# Patient Record
Sex: FEMALE | Race: BLACK OR AFRICAN AMERICAN | Employment: OTHER | URBAN - METROPOLITAN AREA
[De-identification: names, ages, dates, MRNs, and addresses within clinical notes are randomized per-mention and may not be internally consistent; named-entity substitution may affect disease eponyms.]

---

## 2017-12-18 ENCOUNTER — HOSPITAL ENCOUNTER (OUTPATIENT)
Dept: MAMMOGRAPHY | Age: 57
Discharge: HOME | End: 2017-12-18
Attending: OBSTETRICS & GYNECOLOGY
Payer: COMMERCIAL

## 2017-12-18 DIAGNOSIS — Z12.39 SCREENING BREAST EXAMINATION: ICD-10-CM

## 2017-12-18 PROCEDURE — 77067 SCR MAMMO BI INCL CAD: CPT

## 2017-12-26 ENCOUNTER — TELEPHONE (OUTPATIENT)
Dept: OBGYN CLINIC | Age: 57
End: 2017-12-26

## 2017-12-26 NOTE — TELEPHONE ENCOUNTER
I called central scheduling to schedule, but she already has an appointment for the L breast additional views and possible ultrasound on 12/29/17 at 9:15am.

## 2017-12-26 NOTE — TELEPHONE ENCOUNTER
61year old patient called back to office for her mammogram results. Advised patient of the need for additional views and she verbalized understanding. She would like for April to schedule her appointment with the [facility]. She is available all this week if possible.

## 2017-12-29 ENCOUNTER — HOSPITAL ENCOUNTER (OUTPATIENT)
Dept: MAMMOGRAPHY | Age: 57
Discharge: HOME | End: 2017-12-29
Attending: OBSTETRICS & GYNECOLOGY
Payer: COMMERCIAL

## 2017-12-29 DIAGNOSIS — R92.8 ABNORMAL MAMMOGRAM: ICD-10-CM

## 2017-12-29 PROCEDURE — 77065 DX MAMMO INCL CAD UNI: CPT

## 2017-12-29 PROCEDURE — 76642 ULTRASOUND BREAST LIMITED: CPT

## 2018-03-16 ENCOUNTER — OFFICE VISIT (OUTPATIENT)
Dept: OBGYN CLINIC | Age: 58
End: 2018-03-16

## 2018-03-16 VITALS
HEIGHT: 67 IN | SYSTOLIC BLOOD PRESSURE: 118 MMHG | DIASTOLIC BLOOD PRESSURE: 74 MMHG | WEIGHT: 224 LBS | BODY MASS INDEX: 35.16 KG/M2

## 2018-03-16 DIAGNOSIS — Z79.890 NEED FOR PROPHYLACTIC HORMONE REPLACEMENT THERAPY (POSTMENOPAUSAL): ICD-10-CM

## 2018-03-16 DIAGNOSIS — Z01.419 ENCOUNTER FOR GYNECOLOGICAL EXAMINATION WITHOUT ABNORMAL FINDING: ICD-10-CM

## 2018-03-16 RX ORDER — ESTRADIOL 1 MG/1
1 TABLET ORAL DAILY
Qty: 90 TAB | Refills: 4 | Status: SHIPPED | OUTPATIENT
Start: 2018-03-16 | End: 2019-07-11 | Stop reason: SDUPTHER

## 2018-03-16 NOTE — PROGRESS NOTES
Alli Fierro is a ,  60 y.o. female [address] whose No LMP recorded. Patient has had a hysterectomy. who presents for her annual checkup. She is having no significant problems. Hormone Status:    She is not having vasomotor symptoms. The patient is using HRT: Estradiol    Sexual history:    She  reports that she currently engages in sexual activity and has had male partners. She reports using the following method of birth control/protection: Surgical.    Medical conditions:    Since her last annual GYN exam about 2016 ago, she has had the following changes in her health history: none. Pap and Mammogram History:    Her most recent Pap smear was normal obtained 2016     The patient had arecent mammogram 2017, abnormal. Additional views wnl. .    Breast Cancer History/Substance Abuse:    She has no family history of breast cancer. Osteoporosis History:    Family history does not include a first or second degree relative with osteopenia or osteoporosis. A bone density scan was not obtained. She is currently taking calcium and vit D. Past Medical History:   Diagnosis Date    H/O mammogram 12    Negative-in our office    Hx of ectopic pregnancy      Past Surgical History:   Procedure Laterality Date    HX LEFT SALPINGECTOMY      ectopic    HX MELLO AND BSO  10/01    HX TUBAL LIGATION       Tobacco History:  reports that she has never smoked. She does not have any smokeless tobacco history on file. Alcohol Abuse:  reports that she does not drink alcohol. Drug Abuse:  reports that she does not use illicit drugs. Current Outpatient Prescriptions   Medication Sig Dispense Refill    estradiol (ESTRACE) 1 mg tablet Take 1 Tab by mouth daily. 90 Tab 4    vitamin e (E GEMS) 1,000 unit capsule Take 1,000 Units by mouth daily.  loratadine (CLARITIN) 10 mg tablet Take 10 mg by mouth daily.       multivitamin (ONE A DAY) tablet Take 1 tablet by mouth daily. Allergies: Penicillins   Social History     Social History    Marital status:      Spouse name: N/A    Number of children: N/A    Years of education: N/A     Occupational History    Not on file. Social History Main Topics    Smoking status: Never Smoker    Smokeless tobacco: Not on file    Alcohol use No    Drug use: No    Sexual activity: Yes     Partners: Male     Birth control/ protection: Surgical     Other Topics Concern    Not on file     Social History Narrative     There is no problem list on file for this patient.       Review of Systems - History obtained from the patient  Constitutional: negative for weight loss, fever, night sweats  HEENT: negative for hearing loss, earache, congestion, snoring, sorethroat  CV: negative for chest pain, palpitations, edema  Resp: negative for cough, shortness of breath, wheezing  GI: negative for change in bowel habits, abdominal pain, black or bloody stools  : negative for frequency, dysuria, hematuria, vaginal discharge  MSK: negative for back pain, joint pain, muscle pain  Breast: negative for breast lumps, nipple discharge, galactorrhea  Skin :negative for itching, rash, hives  Neuro: negative for dizziness, headache, confusion, weakness  Psych: negative for anxiety, depression, change in mood  Heme/lymph: negative for bleeding, bruising, pallor    Physical Exam    Visit Vitals    /74    Ht 5' 7\" (1.702 m)    Wt 224 lb (101.6 kg)    BMI 35.08 kg/m2     Constitutional  · Appearance: well-nourished, well developed, alert, in no acute distress    HENT  · Head and Face: appears normal    Neck  · Inspection/Palpation: normal appearance, no masses or tenderness  · Lymph Nodes: no lymphadenopathy present  · Thyroid: gland size normal, nontender, no nodules or masses present on palpation    Chest  · Respiratory Effort: breathing normal        Auscultation: normal breath sounds    Cardiovascular  · Heart:  · Auscultation: regular rate and rhythm without murmur    Breasts  · Inspection of Breasts: breasts symmetrical, no skin changes, no discharge present, nipple appearance normal, no skin retraction present  · Palpation of Breasts and Axillae: no masses present on palpation, no breast tenderness  · Axillary Lymph Nodes: no lymphadenopathy present    Gastrointestinal  · Abdominal Examination: abdomen non-tender to palpation, normal bowel sounds, no masses present  · Liver and spleen: no hepatomegaly present, spleen not palpable  · Hernias: no hernias identified    Genitourinary  · External Genitalia: normal appearance for age, no discharge present, no tenderness present, no inflammatory lesions present, no masses present, no atrophy present  · Vagina: normal vaginal vault without central or paravaginal defects, no discharge present, no inflammatory lesions present, no masses present  · Bladder: non-tender to palpation  · Urethra: appears normal  · Cervix: absent  · Uterus: absent  · Adnexa: no adnexal tenderness present, no adnexal masses present  · Perineum: perineum within normal limits, no evidence of trauma, no rashes or skin lesions present  · Anus: anus within normal limits, no hemorrhoids present  · Inguinal Lymph Nodes: no lymphadenopathy present      Skin  · General Inspection: no rash, no lesions identified    Neurologic/Psychiatric  · Mental Status:  · Orientation: grossly oriented to person, place and time  · Mood and Affect: mood normal, affect appropriate    . Assessment:  Routine gynecologic examination  Her current medical status is satisfactory with no evidence of significant gynecologic issues.   HRT  Plan:  Counseled re: diet, exercise, healthy lifestyle  Return for yearly wellness visits  Rec annual mammogram  Will do 3D mammo here  Cont estradiol

## 2018-03-16 NOTE — PATIENT INSTRUCTIONS
Well Visit, Women 48 to 72: Care Instructions  Your Care Instructions    Physical exams can help you stay healthy. Your doctor has checked your overall health and may have suggested ways to take good care of yourself. He or she also may have recommended tests. At home, you can help prevent illness with healthy eating, regular exercise, and other steps. Follow-up care is a key part of your treatment and safety. Be sure to make and go to all appointments, and call your doctor if you are having problems. It's also a good idea to know your test results and keep a list of the medicines you take. How can you care for yourself at home? · Reach and stay at a healthy weight. This will lower your risk for many problems, such as obesity, diabetes, heart disease, and high blood pressure. · Get at least 30 minutes of exercise on most days of the week. Walking is a good choice. You also may want to do other activities, such as running, swimming, cycling, or playing tennis or team sports. · Do not smoke. Smoking can make health problems worse. If you need help quitting, talk to your doctor about stop-smoking programs and medicines. These can increase your chances of quitting for good. · Protect your skin from too much sun. When you're outdoors from 10 a.m. to 4 p.m., stay in the shade or cover up with clothing and a hat with a wide brim. Wear sunglasses that block UV rays. Even when it's cloudy, put broad-spectrum sunscreen (SPF 30 or higher) on any exposed skin. · See a dentist one or two times a year for checkups and to have your teeth cleaned. · Wear a seat belt in the car. · Limit alcohol to 1 drink a day. Too much alcohol can cause health problems. Follow your doctor's advice about when to have certain tests. These tests can spot problems early. · Cholesterol.  Your doctor will tell you how often to have this done based on your age, family history, or other things that can increase your risk for heart attack and stroke. · Blood pressure. Have your blood pressure checked during a routine doctor visit. Your doctor will tell you how often to check your blood pressure based on your age, your blood pressure results, and other factors. · Mammogram. Ask your doctor how often you should have a mammogram, which is an X-ray of your breasts. A mammogram can spot breast cancer before it can be felt and when it is easiest to treat. · Pap test and pelvic exam. Ask your doctor how often you should have a Pap test. You may not need to have a Pap test as often as you used to. · Vision. Have your eyes checked every year or two or as often as your doctor suggests. Some experts recommend that you have yearly exams for glaucoma and other age-related eye problems starting at age 48. · Hearing. Tell your doctor if you notice any change in your hearing. You can have tests to find out how well you hear. · Diabetes. Ask your doctor whether you should have tests for diabetes. · Colon cancer. You should begin tests for colon cancer at age 48. You may have one of several tests. Your doctor will tell you how often to have tests based on your age and risk. Risks include whether you already had a precancerous polyp removed from your colon or whether your parents, sisters and brothers, or children have had colon cancer. · Thyroid disease. Talk to your doctor about whether to have your thyroid checked as part of a regular physical exam. Women have an increased chance of a thyroid problem. · Osteoporosis. You should begin tests for bone density at age 72. If you are younger than 72, ask your doctor whether you have factors that may increase your risk for this disease. You may want to have this test before age 72. · Heart attack and stroke risk. At least every 4 to 6 years, you should have your risk for heart attack and stroke assessed.  Your doctor uses factors such as your age, blood pressure, cholesterol, and whether you smoke or have diabetes to show what your risk for a heart attack or stroke is over the next 10 years. When should you call for help? Watch closely for changes in your health, and be sure to contact your doctor if you have any problems or symptoms that concern you. Where can you learn more? Go to http://marcy-rosanne.info/. Enter E061 in the search box to learn more about \"Well Visit, Women 50 to 72: Care Instructions. \"  Current as of: May 12, 2017  Content Version: 11.4  © 1647-3145 [employer]. Care instructions adapted under license by [employer] (which disclaims liability or warranty for this information). If you have questions about a medical condition or this instruction, always ask your healthcare professional. [address] any warranty or liability for your use of this information.

## 2018-12-19 ENCOUNTER — TELEPHONE (OUTPATIENT)
Dept: OBGYN CLINIC | Age: 58
End: 2018-12-19

## 2018-12-19 NOTE — TELEPHONE ENCOUNTER
Call received at 10:39am      60year old patient last seen in the office on 3/16/18. Patient calling to say that she got a reminder regarding an appointment tomorrow. This nurse advised that it is for a mammogram.  Patient states she usually goes to the imaging center and already has an appointment. Patient asked for the appointment to be cancelled. Patient verbalized understanding.

## 2019-01-24 ENCOUNTER — HOSPITAL ENCOUNTER (OUTPATIENT)
Dept: MAMMOGRAPHY | Age: 59
Discharge: HOME | End: 2019-01-24
Attending: OBSTETRICS & GYNECOLOGY
Payer: COMMERCIAL

## 2019-01-24 DIAGNOSIS — Z12.39 SCREENING BREAST EXAMINATION: ICD-10-CM

## 2019-01-24 DIAGNOSIS — Z12.31 VISIT FOR SCREENING MAMMOGRAM: ICD-10-CM

## 2019-01-24 PROCEDURE — 77067 SCR MAMMO BI INCL CAD: CPT

## 2019-01-24 PROCEDURE — 77063 BREAST TOMOSYNTHESIS BI: CPT

## 2019-07-11 DIAGNOSIS — Z01.419 ENCOUNTER FOR GYNECOLOGICAL EXAMINATION WITHOUT ABNORMAL FINDING: ICD-10-CM

## 2019-07-11 DIAGNOSIS — Z79.890 NEED FOR PROPHYLACTIC HORMONE REPLACEMENT THERAPY (POSTMENOPAUSAL): ICD-10-CM

## 2019-07-11 RX ORDER — ESTRADIOL 1 MG/1
1 TABLET ORAL DAILY
Qty: 30 TAB | Refills: 0 | Status: SHIPPED | OUTPATIENT
Start: 2019-07-11 | End: 2019-07-30 | Stop reason: SDUPTHER

## 2019-07-11 NOTE — PROGRESS NOTES
Pt called requesting a RF on her estradiol. I informed her that she needs to make an appt for an AE. Appt scheduled for 7/30/19 at 10:40 w/ Mayte Diane. 30 script sent in to local pharmacy.

## 2019-07-30 ENCOUNTER — OFFICE VISIT (OUTPATIENT)
Dept: OBGYN CLINIC | Age: 59
End: 2019-07-30

## 2019-07-30 VITALS
WEIGHT: 228 LBS | HEIGHT: 67 IN | BODY MASS INDEX: 35.79 KG/M2 | SYSTOLIC BLOOD PRESSURE: 137 MMHG | DIASTOLIC BLOOD PRESSURE: 91 MMHG

## 2019-07-30 DIAGNOSIS — E66.01 SEVERE OBESITY (HCC): ICD-10-CM

## 2019-07-30 DIAGNOSIS — E28.39 ESTROGEN DEFICIENCY: ICD-10-CM

## 2019-07-30 DIAGNOSIS — Z79.890 NEED FOR PROPHYLACTIC HORMONE REPLACEMENT THERAPY (POSTMENOPAUSAL): ICD-10-CM

## 2019-07-30 DIAGNOSIS — Z01.419 ENCOUNTER FOR GYNECOLOGICAL EXAMINATION WITHOUT ABNORMAL FINDING: Primary | ICD-10-CM

## 2019-07-30 RX ORDER — ESTRADIOL 1 MG/1
1 TABLET ORAL DAILY
Qty: 90 TAB | Refills: 4 | Status: SHIPPED | OUTPATIENT
Start: 2019-07-30 | End: 2020-09-11 | Stop reason: SDUPTHER

## 2019-07-30 NOTE — PROGRESS NOTES
Kishor Fish is a ,  60 y.o. female [address] whose No LMP recorded. Patient has had a hysterectomy. who presents for her annual checkup. She is having no significant problems. Hormone Status:    She is not having vasomotor symptoms. The patient is using HRT: Estrace  Sexual history:    She  reports that she currently engages in sexual activity and has had partner(s) who are Male. She reports using the following method of birth control/protection: Surgical.    Medical conditions:    Since her last annual GYN exam about 3/16/2018 ago, she has had the following changes in her health history: none. Pap and Mammogram History:    Her most recent Pap smear wasnormal obtained 2016 year(s) ago. The patient had a recent mammogram 2019 which was negative for malignancy. Breast Cancer History/Substance Abuse:    She has no family history of breast cancer. Osteoporosis History:    Family history does not include a first or second degree relative with osteopenia or osteoporosis. A bone density scan was not obtained. Order placed today. She is currently taking calcium and vit D. Past Medical History:   Diagnosis Date    H/O mammogram 12    Negative-in our office    Hx of ectopic pregnancy      Past Surgical History:   Procedure Laterality Date    HX LEFT SALPINGECTOMY      ectopic    HX MELLO AND BSO  10/01    HX TUBAL LIGATION       Tobacco History:  reports that she has never smoked. She does not have any smokeless tobacco history on file. Alcohol Abuse:  reports that she does not drink alcohol. Drug Abuse:  reports that she does not use drugs. Current Outpatient Medications   Medication Sig Dispense Refill    estradiol (ESTRACE) 1 mg tablet Take 1 Tab by mouth daily. 30 Tab 0    vitamin e (E GEMS) 1,000 unit capsule Take 1,000 Units by mouth daily.  loratadine (CLARITIN) 10 mg tablet Take 10 mg by mouth daily.       multivitamin (ONE A DAY) tablet Take 1 tablet by mouth daily. Allergies: Penicillins   Social History     Socioeconomic History    Marital status:      Spouse name: Not on file    Number of children: Not on file    Years of education: Not on file    Highest education level: Not on file   Occupational History    Not on file   Social Needs    Financial resource strain: Not on file    Food insecurity:     Worry: Not on file     Inability: Not on file    Transportation needs:     Medical: Not on file     Non-medical: Not on file   Tobacco Use    Smoking status: Never Smoker   Substance and Sexual Activity    Alcohol use: No    Drug use: No    Sexual activity: Yes     Partners: Male     Birth control/protection: Surgical   Lifestyle    Physical activity:     Days per week: Not on file     Minutes per session: Not on file    Stress: Not on file   Relationships    Social connections:     Talks on phone: Not on file     Gets together: Not on file     Attends Holiness service: Not on file     Active member of club or organization: Not on file     Attends meetings of clubs or organizations: Not on file     Relationship status: Not on file    Intimate partner violence:     Fear of current or ex partner: Not on file     Emotionally abused: Not on file     Physically abused: Not on file     Forced sexual activity: Not on file   Other Topics Concern    Not on file   Social History Narrative    Not on file     There is no problem list on file for this patient.       Review of Systems - History obtained from the patient  Constitutional: negative for weight loss, fever, night sweats  HEENT: negative for hearing loss, earache, congestion, snoring, sorethroat  CV: negative for chest pain, palpitations, edema  Resp: negative for cough, shortness of breath, wheezing  GI: negative for change in bowel habits, abdominal pain, black or bloody stools  : negative for frequency, dysuria, hematuria, vaginal discharge  MSK: negative for back pain, joint pain, muscle pain  Breast: negative for breast lumps, nipple discharge, galactorrhea  Skin :negative for itching, rash, hives  Neuro: negative for dizziness, headache, confusion, weakness  Psych: negative for anxiety, depression, change in mood  Heme/lymph: negative for bleeding, bruising, pallor    Physical Exam    Visit Vitals  BP (!) 137/91   Ht 5' 7\" (1.702 m)   Wt 228 lb (103.4 kg)   BMI 35.71 kg/m²     Constitutional  · Appearance: well-nourished, well developed, alert, in no acute distress    HENT  · Head and Face: appears normal    Neck  · Inspection/Palpation: normal appearance, no masses or tenderness  · Lymph Nodes: no lymphadenopathy present  · Thyroid: gland size normal, nontender, no nodules or masses present on palpation    Chest  · Respiratory Effort: breathing normal        Auscultation: normal breath sounds    Cardiovascular  · Heart:  · Auscultation: regular rate and rhythm without murmur    Breasts  · Inspection of Breasts: breasts symmetrical, no skin changes, no discharge present, nipple appearance normal, no skin retraction present  · Palpation of Breasts and Axillae: no masses present on palpation, no breast tenderness  · Axillary Lymph Nodes: no lymphadenopathy present    Gastrointestinal  · Abdominal Examination: abdomen non-tender to palpation, normal bowel sounds, no masses present  · Liver and spleen: no hepatomegaly present, spleen not palpable  · Hernias: no hernias identified    Genitourinary  · External Genitalia: normal appearance for age, no discharge present, no tenderness present, no inflammatory lesions present, no masses present, no atrophy present  · Vagina: normal vaginal vault without central or paravaginal defects, no discharge present, no inflammatory lesions present, no masses present  · Bladder: non-tender to palpation  · Urethra: appears normal  · Cervix: absent  · Uterus: absent  · Adnexa: no adnexal tenderness present, no adnexal masses present  · Perineum: perineum within normal limits, no evidence of trauma, no rashes or skin lesions present  · Anus: anus within normal limits, no hemorrhoids present  · Inguinal Lymph Nodes: no lymphadenopathy present      Skin  · General Inspection: no rash, no lesions identified    Neurologic/Psychiatric  · Mental Status:  · Orientation: grossly oriented to person, place and time  · Mood and Affect: mood normal, affect appropriate    . Assessment:  Routine gynecologic examination  Her current medical status is satisfactory with no evidence of significant gynecologic issues.     Plan:  Counseled re: diet, exercise, healthy lifestyle  Return for yearly wellness visits  Rec annual mammogram  Pap  Cont estradiol  DEXA

## 2019-08-02 LAB
CYTOLOGIST CVX/VAG CYTO: NORMAL
CYTOLOGY CVX/VAG DOC CYTO: NORMAL
CYTOLOGY CVX/VAG DOC THIN PREP: NORMAL
CYTOLOGY HISTORY:: NORMAL
DX ICD CODE: NORMAL
LABCORP, 190119: NORMAL
Lab: NORMAL
OTHER STN SPEC: NORMAL
STAT OF ADQ CVX/VAG CYTO-IMP: NORMAL

## 2019-09-04 ENCOUNTER — HOSPITAL ENCOUNTER (OUTPATIENT)
Dept: MAMMOGRAPHY | Age: 59
Discharge: HOME | End: 2019-09-04
Attending: OBSTETRICS & GYNECOLOGY
Payer: COMMERCIAL

## 2019-09-04 DIAGNOSIS — E28.39 ESTROGEN DEFICIENCY: ICD-10-CM

## 2019-09-04 PROCEDURE — 77080 DXA BONE DENSITY AXIAL: CPT

## 2020-06-08 ENCOUNTER — HOSPITAL ENCOUNTER (OUTPATIENT)
Dept: MAMMOGRAPHY | Age: 60
Discharge: HOME | End: 2020-06-08
Attending: OBSTETRICS & GYNECOLOGY
Payer: COMMERCIAL

## 2020-06-08 DIAGNOSIS — Z12.31 VISIT FOR SCREENING MAMMOGRAM: ICD-10-CM

## 2020-06-08 PROCEDURE — 77063 BREAST TOMOSYNTHESIS BI: CPT

## 2020-06-11 ENCOUNTER — HOSPITAL ENCOUNTER (OUTPATIENT)
Dept: MAMMOGRAPHY | Age: 60
Discharge: HOME | End: 2020-06-11
Attending: NURSE PRACTITIONER
Payer: COMMERCIAL

## 2020-06-11 DIAGNOSIS — R92.8 ABNORMALITY OF RIGHT BREAST ON SCREENING MAMMOGRAM: ICD-10-CM

## 2020-06-11 PROCEDURE — 76642 ULTRASOUND BREAST LIMITED: CPT

## 2020-09-11 ENCOUNTER — OFFICE VISIT (OUTPATIENT)
Dept: OBGYN CLINIC | Age: 60
End: 2020-09-11
Payer: COMMERCIAL

## 2020-09-11 VITALS — SYSTOLIC BLOOD PRESSURE: 128 MMHG | BODY MASS INDEX: 35.87 KG/M2 | WEIGHT: 229 LBS | DIASTOLIC BLOOD PRESSURE: 82 MMHG

## 2020-09-11 DIAGNOSIS — Z01.419 ENCOUNTER FOR GYNECOLOGICAL EXAMINATION WITHOUT ABNORMAL FINDING: Primary | ICD-10-CM

## 2020-09-11 DIAGNOSIS — Z79.890 NEED FOR PROPHYLACTIC HORMONE REPLACEMENT THERAPY (POSTMENOPAUSAL): ICD-10-CM

## 2020-09-11 PROCEDURE — 99396 PREV VISIT EST AGE 40-64: CPT | Performed by: OBSTETRICS & GYNECOLOGY

## 2020-09-11 RX ORDER — ESTRADIOL 1 MG/1
1 TABLET ORAL DAILY
Qty: 90 TAB | Refills: 3 | Status: SHIPPED | OUTPATIENT
Start: 2020-09-11 | End: 2020-12-01 | Stop reason: SDUPTHER

## 2020-09-11 RX ORDER — ESTRADIOL 1 MG/1
1 TABLET ORAL DAILY
Qty: 90 TAB | Refills: 0 | Status: SHIPPED | OUTPATIENT
Start: 2020-09-11 | End: 2020-12-14

## 2020-09-11 NOTE — PROGRESS NOTES
Ariana Andrade is a ,  63 y.o. female [address] Patient has had a hysterectomy. who presents for her annual checkup. She is having no significant problems. [place]  of [place]    Hormone Status:    She is not having vasomotor symptoms. The patient is using HRT: estrace    Sexual history:    She  reports being sexually active and has had partner(s) who are Male. She reports using the following method of birth control/protection: Surgical.    Medical conditions:    Since her last annual GYN exam was 19 ,she has had the following changes in her health history: none. Pap and Mammogram History:    Her most recent Pap smear was normal HPV NEG obtained 19. The patient had mammo on 2020 then adtl views on 2020 which were WNL. Breast Cancer History/Substance Abuse:    She has no and a family history of breast cancer. Osteoporosis History:    Family history does not include a first or second degree relative with osteopenia or osteoporosis. A bone density scan was obtained 19 and revealed a normal scan, osteopenia, osteoporosis. .   She is currently taking calcium and vit D. Past Medical History:   Diagnosis Date    H/O mammogram 13    Negative-in our office    Hx of ectopic pregnancy     Routine Papanicolaou smear 19 neg     Past Surgical History:   Procedure Laterality Date    HX LEFT SALPINGECTOMY      ectopic    HX MELLO AND BSO  10/01    HX TUBAL LIGATION       Tobacco History:  reports that she has never smoked. She has never used smokeless tobacco.  Alcohol Abuse:  reports no history of alcohol use. Drug Abuse:  reports no history of drug use. Current Outpatient Medications   Medication Sig Dispense Refill    estradiol (ESTRACE) 1 mg tablet Take 1 Tab by mouth daily. 90 Tab 4    vitamin e (E GEMS) 1,000 unit capsule Take 1,000 Units by mouth daily.  multivitamin (ONE A DAY) tablet Take 1 tablet by mouth daily.       loratadine (CLARITIN) 10 mg tablet Take 10 mg by mouth daily.        Allergies: Penicillins   Social History     Socioeconomic History    Marital status:      Spouse name: Not on file    Number of children: Not on file    Years of education: Not on file    Highest education level: Not on file   Occupational History    Not on file   Social Needs    Financial resource strain: Not on file    Food insecurity     Worry: Not on file     Inability: Not on file    Transportation needs     Medical: Not on file     Non-medical: Not on file   Tobacco Use    Smoking status: Never Smoker    Smokeless tobacco: Never Used   Substance and Sexual Activity    Alcohol use: No    Drug use: No    Sexual activity: Yes     Partners: Male     Birth control/protection: Surgical   Lifestyle    Physical activity     Days per week: Not on file     Minutes per session: Not on file    Stress: Not on file   Relationships    Social connections     Talks on phone: Not on file     Gets together: Not on file     Attends Methodist service: Not on file     Active member of club or organization: Not on file     Attends meetings of clubs or organizations: Not on file     Relationship status: Not on file    Intimate partner violence     Fear of current or ex partner: Not on file     Emotionally abused: Not on file     Physically abused: Not on file     Forced sexual activity: Not on file   Other Topics Concern    Not on file   Social History Narrative    Not on file     Patient Active Problem List   Diagnosis Code    Severe obesity ([facility] [address] [facility] E66.01       Review of Systems - History obtained from the patient  Constitutional: negative for weight loss, fever, night sweats  HEENT: negative for hearing loss, earache, congestion, snoring, sorethroat  CV: negative for chest pain, palpitations, edema  Resp: negative for cough, shortness of breath, wheezing  GI: negative for change in bowel habits, abdominal pain, black or bloody stools  : negative for frequency, dysuria, hematuria, vaginal discharge  MSK: negative for back pain, joint pain, muscle pain  Breast: negative for breast lumps, nipple discharge, galactorrhea  Skin :negative for itching, rash, hives  Neuro: negative for dizziness, headache, confusion, weakness  Psych: negative for anxiety, depression, change in mood  Heme/lymph: negative for bleeding, bruising, pallor    Physical Exam    Visit Vitals  /82   Wt 229 lb (103.9 kg)   BMI 35.87 kg/m²     Constitutional  · Appearance: well-nourished, well developed, alert, in no acute distress    HENT  · Head and Face: appears normal    Neck  · Inspection/Palpation: normal appearance, no masses or tenderness  · Lymph Nodes: no lymphadenopathy present  · Thyroid: gland size normal, nontender, no nodules or masses present on palpation    Chest  · Respiratory Effort: breathing normal        Auscultation: normal breath sounds    Cardiovascular  · Heart:  · Auscultation: regular rate and rhythm without murmur    Breasts  · Inspection of Breasts: breasts symmetrical, no skin changes, no discharge present, nipple appearance normal, no skin retraction present  · Palpation of Breasts and Axillae: no masses present on palpation, no breast tenderness  · Axillary Lymph Nodes: no lymphadenopathy present    Gastrointestinal  · Abdominal Examination: abdomen non-tender to palpation, normal bowel sounds, no masses present  · Liver and spleen: no hepatomegaly present, spleen not palpable  · Hernias: no hernias identified    Genitourinary  · External Genitalia: normal appearance for age, no discharge present, no tenderness present, no inflammatory lesions present, no masses present, no atrophy present  · Vagina: normal vaginal vault without central or paravaginal defects, no discharge present, no inflammatory lesions present, no masses present  · Bladder: non-tender to palpation  · Urethra: appears normal  · Cervix: absent  · Uterus: absent  · Adnexa: no adnexal tenderness present, no adnexal masses present  · Perineum: perineum within normal limits, no evidence of trauma, no rashes or skin lesions present  · Anus: anus within normal limits, no hemorrhoids present  · Inguinal Lymph Nodes: no lymphadenopathy present      Skin  · General Inspection: no rash, no lesions identified    Neurologic/Psychiatric  · Mental Status:  · Orientation: grossly oriented to person, place and time  · Mood and Affect: mood normal, affect appropriate    . Assessment:  Routine gynecologic examination  Her current medical status is satisfactory with no evidence of significant gynecologic issues.     Plan:  Counseled re: diet, exercise, healthy lifestyle  Return for yearly wellness visits  Rec annual mammogram  Cont Estrace

## 2020-12-01 ENCOUNTER — TELEPHONE (OUTPATIENT)
Dept: OBGYN CLINIC | Age: 60
End: 2020-12-01

## 2020-12-01 RX ORDER — ESTRADIOL 1 MG/1
1 TABLET ORAL DAILY
Qty: 90 TAB | Refills: 3 | Status: SHIPPED | OUTPATIENT
Start: 2020-12-01 | End: 2021-12-14

## 2020-12-01 NOTE — TELEPHONE ENCOUNTER
Patient was seen for visit on 9/11/20. She was supposed to have a 90 day supplyof Estradiol (Estrace) 1 mg tabs take daily to be sent to [facility] in [facility] and the other rx 90 day supply with 3 refills was supposed to go to [facility]. She has not heard from [facility]. RX was sent incorrectly and both rx's were routed to [facility]. I will reroute the 90 day supply with 3 refills to [place] as she originally requested.

## 2020-12-08 DIAGNOSIS — Z79.890 NEED FOR PROPHYLACTIC HORMONE REPLACEMENT THERAPY (POSTMENOPAUSAL): ICD-10-CM

## 2020-12-08 DIAGNOSIS — Z01.419 ENCOUNTER FOR GYNECOLOGICAL EXAMINATION WITHOUT ABNORMAL FINDING: ICD-10-CM

## 2020-12-08 RX ORDER — ESTRADIOL 1 MG/1
TABLET ORAL
Qty: 90 TAB | Refills: 0 | OUTPATIENT
Start: 2020-12-08

## 2020-12-08 NOTE — TELEPHONE ENCOUNTER
Last AE 9/11/20    Pharmacy request refill for Estrace 1 mg tabs 1 tab daily      RX was already previously sent for Estrace 1 mg on 12/1/20

## 2020-12-14 ENCOUNTER — TELEPHONE (OUTPATIENT)
Dept: OBGYN CLINIC | Age: 60
End: 2020-12-14

## 2020-12-14 DIAGNOSIS — Z79.890 NEED FOR PROPHYLACTIC HORMONE REPLACEMENT THERAPY (POSTMENOPAUSAL): ICD-10-CM

## 2020-12-14 DIAGNOSIS — Z01.419 ENCOUNTER FOR GYNECOLOGICAL EXAMINATION WITHOUT ABNORMAL FINDING: ICD-10-CM

## 2020-12-14 RX ORDER — ESTRADIOL 1 MG/1
1 TABLET ORAL DAILY
Qty: 30 TAB | Refills: 0 | Status: SHIPPED | OUTPATIENT
Start: 2020-12-14 | End: 2021-12-14 | Stop reason: SDUPTHER

## 2020-12-14 NOTE — TELEPHONE ENCOUNTER
Call received at 9:20am  [address] patient    63year old patient last seen in the office on 12/8/2020     Patient had her prescription sent on 12/2/2020 to her mail order pharmacy and she called them and was told it was on the way and advised her to contact her MD for a one month supply to local pharmacy    rx pended for amend/sign

## 2020-12-14 NOTE — TELEPHONE ENCOUNTER
Patient was advised of prescription refill sent by work in [place] , Dr. Jaja Marquez and verbalized understanding.

## 2021-05-18 ENCOUNTER — TRANSCRIBE ORDER (OUTPATIENT)
Dept: SCHEDULING | Age: 61
End: 2021-05-18

## 2021-05-18 DIAGNOSIS — Z12.31 VISIT FOR SCREENING MAMMOGRAM: Primary | ICD-10-CM

## 2021-10-01 ENCOUNTER — HOSPITAL ENCOUNTER (OUTPATIENT)
Dept: MAMMOGRAPHY | Age: 61
Discharge: HOME | End: 2021-10-01
Attending: OBSTETRICS & GYNECOLOGY
Payer: COMMERCIAL

## 2021-10-01 DIAGNOSIS — Z12.31 VISIT FOR SCREENING MAMMOGRAM: ICD-10-CM

## 2021-10-01 PROCEDURE — 77063 BREAST TOMOSYNTHESIS BI: CPT

## 2021-10-07 ENCOUNTER — TRANSCRIBE ORDER (OUTPATIENT)
Dept: SCHEDULING | Age: 61
End: 2021-10-07

## 2021-10-07 DIAGNOSIS — Z12.31 SCREENING MAMMOGRAM FOR HIGH-RISK PATIENT: Primary | ICD-10-CM

## 2021-12-08 NOTE — PROGRESS NOTES
Mildred Rodas is a ,  57 y.o. female BLACK/ whose No LMP recorded. Patient has had a hysterectomy. who presents for her annual checkup. She is having no significant problems. Hormone Status:    She is not having vasomotor symptoms. The patient is using HRT: estrace    Sexual history:    She  reports being sexually active and has had partner(s) who are male. She reports using the following method of birth control/protection: Surgical.    Medical conditions:    Since her last annual GYN exam about one year ago, she has had the following changes in her health history: none. Pap and Mammogram History:    Her most recent Pap smear was 2019 neg    The patient had a recent mammogram 10/1/2021 which was negative for malignancy. Breast Cancer History/Substance Abuse:    She has no family history of breast cancer. Osteoporosis History:    Family history does not include a first or second degree relative with osteopenia or osteoporosis. Past Medical History:   Diagnosis Date    H/O mammogram 13    Negative-in our office    Hx of ectopic pregnancy     Routine Papanicolaou smear 19 neg     Past Surgical History:   Procedure Laterality Date    HX LEFT SALPINGECTOMY      ectopic    HX MELLO AND BSO  10/01    HX TUBAL LIGATION       Tobacco History:  reports that she has never smoked. She has never used smokeless tobacco.  Alcohol Abuse:  reports no history of alcohol use. Drug Abuse:  reports no history of drug use. Current Outpatient Medications   Medication Sig Dispense Refill    estradioL (ESTRACE) 1 mg tablet Take 1 Tab by mouth daily. 30 Tab 0    estradioL (ESTRACE) 1 mg tablet Take 1 Tab by mouth daily. 90 Tab 3    vitamin e (E GEMS) 1,000 unit capsule Take 1,000 Units by mouth daily.  loratadine (CLARITIN) 10 mg tablet Take 10 mg by mouth daily.  multivitamin (ONE A DAY) tablet Take 1 tablet by mouth daily.        Allergies: Penicillins Social History     Socioeconomic History    Marital status:      Spouse name: Not on file    Number of children: Not on file    Years of education: Not on file    Highest education level: Not on file   Occupational History    Not on file   Tobacco Use    Smoking status: Never Smoker    Smokeless tobacco: Never Used   Substance and Sexual Activity    Alcohol use: No    Drug use: No    Sexual activity: Yes     Partners: Male     Birth control/protection: Surgical   Other Topics Concern    Not on file   Social History Narrative    Not on file     Social Determinants of Health     Financial Resource Strain:     Difficulty of Paying Living Expenses: Not on file   Food Insecurity:     Worried About Running Out of Food in the Last Year: Not on file    Justin of Food in the Last Year: Not on file   Transportation Needs:     Lack of Transportation (Medical): Not on file    Lack of Transportation (Non-Medical):  Not on file   Physical Activity:     Days of Exercise per Week: Not on file    Minutes of Exercise per Session: Not on file   Stress:     Feeling of Stress : Not on file   Social Connections:     Frequency of Communication with Friends and Family: Not on file    Frequency of Social Gatherings with Friends and Family: Not on file    Attends Scientologist Services: Not on file    Active Member of [address] [place] [address] [employer] or Organizations: Not on file    Attends Club or Organization Meetings: Not on file    Marital Status: Not on file   Intimate Partner Violence:     Fear of Current or Ex-Partner: Not on file    Emotionally Abused: Not on file    Physically Abused: Not on file    Sexually Abused: Not on file   Housing Stability:     Unable to Pay for Housing in the Last Year: Not on file    Number of [place] in the Last Year: Not on file    Unstable Housing in the Last Year: Not on file     Patient Active Problem List   Diagnosis Code    [facility] ([facility] E66.01       Review of Systems - History obtained from the patient  ROS     Physical Exam    Visit Vitals  /76   Wt 229 lb (103.9 kg)   BMI 35.87 kg/m²     Constitutional  · Appearance: well-nourished, well developed, alert, in no acute distress    HENT  · Head and Face: appears normal    Neck  · Inspection/Palpation: normal appearance, no masses or tenderness  · Lymph Nodes: no lymphadenopathy present  · Thyroid: gland size normal, nontender, no nodules or masses present on palpation    Chest  · Respiratory Effort: breathing normal        Auscultation: normal breath sounds    Cardiovascular  · Heart:  · Auscultation: regular rate and rhythm without murmur    Breasts  · Inspection of Breasts: breasts symmetrical, no skin changes, no discharge present, nipple appearance normal, no skin retraction present  · Palpation of Breasts and Axillae: no masses present on palpation, no breast tenderness  · Axillary Lymph Nodes: no lymphadenopathy present    Gastrointestinal  · Abdominal Examination: abdomen non-tender to palpation, normal bowel sounds, no masses present  · Liver and spleen: no hepatomegaly present, spleen not palpable  · Hernias: no hernias identified    Genitourinary  · External Genitalia: normal appearance for age, no discharge present, no tenderness present, no inflammatory lesions present, no masses present, no atrophy present  · Vagina: normal vaginal vault without central or paravaginal defects, no discharge present, no inflammatory lesions present, no masses present  · Bladder: non-tender to palpation  · Urethra: appears normal  · Cervix: absent  · Uterus: absent  · Adnexa: no adnexal tenderness present, no adnexal masses present  · Perineum: perineum within normal limits, no evidence of trauma, no rashes or skin lesions present  · Anus: anus within normal limits, no hemorrhoids present  · Inguinal Lymph Nodes: no lymphadenopathy present      Skin  · General Inspection: no rash, no lesions identified    Neurologic/Psychiatric  · Mental Status:  · Orientation: grossly oriented to person, place and time  · Mood and Affect: mood normal, affect appropriate    . Assessment:  Routine gynecologic examination  Her current medical status is satisfactory with no evidence of significant gynecologic issues.     Plan:  Counseled re: diet, exercise, healthy lifestyle  Return for yearly wellness visits  Rec annual mammogram  Cont estradiol 1mg daily

## 2021-12-14 ENCOUNTER — OFFICE VISIT (OUTPATIENT)
Dept: OBGYN CLINIC | Age: 61
End: 2021-12-14
Payer: COMMERCIAL

## 2021-12-14 VITALS — DIASTOLIC BLOOD PRESSURE: 76 MMHG | BODY MASS INDEX: 35.87 KG/M2 | WEIGHT: 229 LBS | SYSTOLIC BLOOD PRESSURE: 119 MMHG

## 2021-12-14 DIAGNOSIS — Z79.890 NEED FOR PROPHYLACTIC HORMONE REPLACEMENT THERAPY (POSTMENOPAUSAL): ICD-10-CM

## 2021-12-14 DIAGNOSIS — Z01.419 ENCNTR FOR GYN EXAM (GENERAL) (ROUTINE) W/O ABN FINDINGS: Primary | ICD-10-CM

## 2021-12-14 PROCEDURE — 99396 PREV VISIT EST AGE 40-64: CPT | Performed by: OBSTETRICS & GYNECOLOGY

## 2021-12-14 RX ORDER — ESTRADIOL 1 MG/1
1 TABLET ORAL DAILY
Qty: 90 TABLET | Refills: 4 | Status: SHIPPED | OUTPATIENT
Start: 2021-12-14

## 2022-03-19 PROBLEM — E66.01 SEVERE OBESITY (HCC): Status: ACTIVE | Noted: 2019-07-30

## 2022-10-03 ENCOUNTER — HOSPITAL ENCOUNTER (OUTPATIENT)
Dept: MAMMOGRAPHY | Age: 62
Discharge: HOME | End: 2022-10-03
Attending: OBSTETRICS & GYNECOLOGY
Payer: COMMERCIAL

## 2022-10-03 ENCOUNTER — TRANSCRIBE ORDER (OUTPATIENT)
Dept: MAMMOGRAPHY | Age: 62
End: 2022-10-03

## 2022-10-03 DIAGNOSIS — Z12.31 SCREENING MAMMOGRAM FOR HIGH-RISK PATIENT: ICD-10-CM

## 2022-10-03 PROCEDURE — 77063 BREAST TOMOSYNTHESIS BI: CPT

## 2023-01-18 ENCOUNTER — TELEPHONE (OUTPATIENT)
Dept: OBGYN CLINIC | Age: 63
End: 2023-01-18

## 2023-01-18 DIAGNOSIS — Z79.890 NEED FOR PROPHYLACTIC HORMONE REPLACEMENT THERAPY (POSTMENOPAUSAL): ICD-10-CM

## 2023-01-18 RX ORDER — ESTRADIOL 1 MG/1
1 TABLET ORAL DAILY
Qty: 30 TABLET | Refills: 0 | Status: SHIPPED | OUTPATIENT
Start: 2023-01-18

## 2023-01-18 NOTE — TELEPHONE ENCOUNTER
60year old patient last seen in the office on 12/14/2021 and has next appointment on 1/26/2023 and is calling to get a refill of her estradioL (ESTRACE) 1 mg tablet [[id number]]     Order Details  Dose: 1 mg Route: Oral Frequency: DAILY   Dispense Quantity: 90 Tablet Refills: 4          Sig: Take 1 Tablet by mouth daily. To a local pharmacy to get her to her appointment    Prescription sent as per Md order to get patient to her scheduled appointment    Patient advised of need to keep appointment in order to get further refills    Patient verbalized understanding.

## 2023-01-25 NOTE — PROGRESS NOTES
Stevie Valentin is a 60 y.o. female returns for an annual exam     No chief complaint on file. No LMP recorded. Patient has had a hysterectomy. Her periods are absent. She does not have dysmenorrhea. Problems: no significant problems  Birth Control: status post hysterectomy. Last Pap: NILM 7/30/19  She does not have a history of DANNY 2, 3 or cervical cancer. Last Mammogram: normal 10/3/22. Scheduled 10/4/23. Last colonoscopy: Reports normal August 4, 2022      1. Have you been to the ER, urgent care clinic, or hospitalized since your last visit? No    2. Have you seen or consulted any other health care providers outside of the [address] [place] [address] since your last visit? No    Examination chaperoned by Renna Primrose, RN.

## 2023-01-26 ENCOUNTER — OFFICE VISIT (OUTPATIENT)
Dept: OBGYN CLINIC | Age: 63
End: 2023-01-26

## 2023-01-26 VITALS
DIASTOLIC BLOOD PRESSURE: 80 MMHG | BODY MASS INDEX: 36.38 KG/M2 | WEIGHT: 231.8 LBS | HEIGHT: 67 IN | SYSTOLIC BLOOD PRESSURE: 131 MMHG

## 2023-01-26 RX ORDER — MELATONIN
1000 DAILY
COMMUNITY

## 2023-01-26 RX ORDER — PHENYLEPHRINE HCL 10 MG
2 TABLET ORAL DAILY
COMMUNITY
Start: 2018-10-15

## 2023-01-31 NOTE — PROGRESS NOTES
Sriram Romero is a 60 y.o. female returns for an annual exam     Chief Complaint   Patient presents with    Well Woman       No LMP recorded. Patient has had a hysterectomy. Problems: no significant problems  Last Pap: normal 07/30/2019  She does not have a history of DANNY 2, 3 or cervical cancer. Last Mammogram:  10/07/2021 . It was normal   Last Bone Density: obtained 3 year(s) ago. Last colonoscopy: normal obtained 3 year(s) ago. 1. Have you been to the ER, urgent care clinic, or hospitalized since your last visit? No    2. Have you seen or consulted any other health care providers outside of the [address] [place] [address] since your last visit?  No    Examination chaperoned by Jorge Cooper RN

## 2023-02-02 ENCOUNTER — OFFICE VISIT (OUTPATIENT)
Dept: OBGYN CLINIC | Age: 63
End: 2023-02-02
Payer: COMMERCIAL

## 2023-02-02 VITALS — BODY MASS INDEX: 35.55 KG/M2 | SYSTOLIC BLOOD PRESSURE: 134 MMHG | WEIGHT: 227 LBS | DIASTOLIC BLOOD PRESSURE: 84 MMHG

## 2023-02-02 DIAGNOSIS — Z01.419 ENCOUNTER FOR GYNECOLOGICAL EXAMINATION: Primary | ICD-10-CM

## 2023-02-02 DIAGNOSIS — Z79.890 NEED FOR PROPHYLACTIC HORMONE REPLACEMENT THERAPY (POSTMENOPAUSAL): ICD-10-CM

## 2023-02-02 PROCEDURE — 99396 PREV VISIT EST AGE 40-64: CPT | Performed by: OBSTETRICS & GYNECOLOGY

## 2023-02-02 RX ORDER — ESTRADIOL 1 MG/1
1 TABLET ORAL DAILY
Qty: 90 TABLET | Refills: 4 | Status: SHIPPED | OUTPATIENT
Start: 2023-02-02

## 2023-02-02 NOTE — PROGRESS NOTES
Antoinette Dorman is a ,  60 y.o. female BLACK/ whose No LMP recorded. Patient has had a hysterectomy. was on  who presents for her annual checkup. She is having no significant problems. Hormone Status:    She is not having vasomotor symptoms with HRT. She its taking estradiol 1mg daily. Sexual history:    She  reports being sexually active and has had partner(s) who are male. She reports using the following method of birth control/protection: Surgical.    Pap and Mammogram History:    Last Pap: normal 2019. No HPV testing at that time. She does not have a history of DANNY 2, 3 or cervical cancer. Last Mammogram:  10/03/2022 . It was normal. Does have hx of R breast cysts that has been benign on US. Last Bone Density: obtained 3 year(s) ago, normal.  Last colonoscopy: normal in 2022, 5 year follow up. Does have brother with colon cancer s/p surgery. She has no hx of polyps. She has a family history of breast cancer in her mother. She was diagnosed when she was 79. No family hx of endometrial cancer, or ovarian cancer. Past Medical History:   Diagnosis Date    H/O mammogram 13    Negative-in our office    Hx of ectopic pregnancy     Routine Papanicolaou smear 19 neg     Past Surgical History:   Procedure Laterality Date    HX LEFT SALPINGECTOMY      ectopic    HX MELLO AND BSO  10/01    HX TUBAL LIGATION       Tobacco History:  reports that she has never smoked. She has never used smokeless tobacco.  Alcohol Abuse:  reports no history of alcohol use. Drug Abuse:  reports no history of drug use. Current Outpatient Medications   Medication Sig Dispense Refill    cholecalciferol (VITAMIN D3) (1000 Units /25 mcg) tablet Take 1,000 Units by mouth daily. cinnamon bark 500 mg cap Take 2 Tablets by mouth daily. estradioL (ESTRACE) 1 mg tablet Take 1 Tablet by mouth daily.  30 Tablet 0    vitamin e (E GEMS) 1,000 unit capsule Take 1,000 Units by mouth daily.      loratadine (CLARITIN) 10 mg tablet Take 10 mg by mouth daily. multivitamin (ONE A DAY) tablet Take 1 tablet by mouth daily. Allergies: Penicillins   Social History     Socioeconomic History    Marital status:      Spouse name: Not on file    Number of children: Not on file    Years of education: Not on file    Highest education level: Not on file   Occupational History    Not on file   Tobacco Use    Smoking status: Never    Smokeless tobacco: Never   Vaping Use    Vaping Use: Never used   Substance and Sexual Activity    Alcohol use: No    Drug use: No    Sexual activity: Yes     Partners: Male     Birth control/protection: Surgical   Other Topics Concern    Not on file   Social History Narrative    Not on file     Social Determinants of Health     Financial Resource Strain: Not on file   Food Insecurity: Not on file   Transportation Needs: Not on file   Physical Activity: Not on file   Stress: Not on file   Social Connections: Not on file   Intimate Partner Violence: Not on file   Housing Stability: Not on file     Patient Active Problem List   Diagnosis Code    Severe obesity ([facility] [address] E66.01       Review of Systems - History obtained from the patient  Review of Systems   Constitutional:  Negative for chills and fever. Genitourinary:  Negative for dysuria. Denies vaginal bleeding, abnormal discharge. Physical Exam    Visit Vitals  /84   Wt 227 lb (103 kg)   BMI 35.55 kg/m²     Constitutional  Appearance: well-nourished, well developed, alert, in no acute distress    HENT  Head and Face: appears normal    Neck  Inspection/Palpation: normal appearance, no masses or tenderness  Lymph Nodes: no lymphadenopathy present  Thyroid: gland size normal, nontender, no nodules or masses present on palpation    Chest  Respiratory Effort: breathing normal        Auscultation: normal breath sounds    Cardiovascular  Heart:   Auscultation: regular rate and rhythm without murmur    Breasts  Inspection of Breasts: breasts symmetrical, no skin changes, no discharge present, nipple appearance normal, no skin retraction present  Palpation of Breasts and Axillae: no masses present on palpation, no breast tenderness  Axillary Lymph Nodes: no lymphadenopathy present    Gastrointestinal  Abdominal Examination: abdomen non-tender to palpation, normal bowel sounds, no masses present  Liver and spleen: no hepatomegaly present, spleen not palpable  Hernias: no hernias identified    Genitourinary  External Genitalia: normal appearance for age, no discharge present, no tenderness present, no inflammatory lesions present, no masses present, no atrophy present  Vagina: normal vaginal vault without central or paravaginal defects, no discharge present, no inflammatory lesions present, no masses present  Bladder: non-tender to palpation  Urethra: appears normal  Cervix: absent  Uterus: absent  Adnexa: no adnexal tenderness present, no adnexal masses present  Perineum: perineum within normal limits, no evidence of trauma, no rashes or skin lesions present  Anus: anus within normal limits, no hemorrhoids present  Inguinal Lymph Nodes: no lymphadenopathy present      Skin  General Inspection: no rash, no lesions identified    Neurologic/Psychiatric  Mental Status:  Orientation: grossly oriented to person, place and time  Mood and Affect: mood normal, affect appropriate    . Assessment:  Routine gynecologic examination  Her current medical status is satisfactory with no evidence of significant gynecologic issues.     Plan:  Counseled re: diet, exercise, healthy lifestyle  Return for yearly wellness visits  Rec annual mammogram  Pap  Cont estradiol

## 2023-05-17 RX ORDER — MULTIVIT WITH MINERALS/LUTEIN
1000 TABLET ORAL DAILY
COMMUNITY

## 2023-05-17 RX ORDER — AMPICILLIN TRIHYDRATE 250 MG
2 CAPSULE ORAL DAILY
COMMUNITY
Start: 2018-10-15

## 2023-05-17 RX ORDER — ESTRADIOL 1 MG/1
1 TABLET ORAL DAILY
COMMUNITY
Start: 2023-02-02

## 2023-05-17 RX ORDER — LORATADINE 10 MG/1
10 TABLET ORAL DAILY
COMMUNITY

## 2023-10-04 ENCOUNTER — HOSPITAL ENCOUNTER (OUTPATIENT)
Facility: HOSPITAL | Age: 63
Discharge: HOME | End: 2023-10-07
Payer: COMMERCIAL

## 2023-10-04 VITALS — HEIGHT: 67 IN | WEIGHT: 227 LBS | BODY MASS INDEX: 35.63 KG/M2

## 2023-10-04 DIAGNOSIS — Z12.31 VISIT FOR SCREENING MAMMOGRAM: ICD-10-CM

## 2023-10-04 PROCEDURE — 77063 BREAST TOMOSYNTHESIS BI: CPT

## 2023-10-09 ENCOUNTER — TRANSCRIBE ORDERS (OUTPATIENT)
Facility: HOSPITAL | Age: 63
End: 2023-10-09

## 2023-10-09 DIAGNOSIS — Z12.31 VISIT FOR SCREENING MAMMOGRAM: Primary | ICD-10-CM

## 2024-02-16 ENCOUNTER — OFFICE VISIT (OUTPATIENT)
Age: 64
End: 2024-02-16

## 2024-02-16 VITALS — WEIGHT: 230.2 LBS | BODY MASS INDEX: 36.05 KG/M2 | SYSTOLIC BLOOD PRESSURE: 153 MMHG | DIASTOLIC BLOOD PRESSURE: 83 MMHG

## 2024-02-16 DIAGNOSIS — Z01.419 ENCOUNTER FOR GYNECOLOGICAL EXAMINATION (GENERAL) (ROUTINE) WITHOUT ABNORMAL FINDINGS: Primary | ICD-10-CM

## 2024-02-16 DIAGNOSIS — Z79.890 HORMONE REPLACEMENT THERAPY: ICD-10-CM

## 2024-02-16 RX ORDER — ESTRADIOL 1 MG/1
1 TABLET ORAL DAILY
Qty: 90 TABLET | Refills: 4 | Status: SHIPPED | OUTPATIENT
Start: 2024-02-16

## 2024-02-16 NOTE — PROGRESS NOTES
Carrie Caicedo is a 64 y.o. female returns for an annual exam     Chief Complaint   Patient presents with    Annual Exam       No LMP recorded. Patient has had a hysterectomy.  Her periods are absent  Problems: no problems  Birth Control: status post hysterectomy.  Last Pap: 2/2/2023 negative  She does not have a history of GILBERTO 2, 3 or cervical cancer.   Last Mammogram: had a recent mammogram 10/4/2023 which was negative for malignancy.     Last Bone Density: 9/4/2019 normal  Last colonoscopy: 8/2022 per patient normal      Examination chaperoned by Gifty Trivedi MA.  
hearing loss, earache, congestion, snoring, sorethroat  CV: negative for chest pain, palpitations, edema  Resp: negative for cough, shortness of breath, wheezing  GI: negative for change in bowel habits, abdominal pain, black or bloody stools  : negative for frequency, dysuria, hematuria, vaginal discharge  MSK: negative for back pain, joint pain, muscle pain  Breast: negative for breast lumps, nipple discharge, galactorrhea  Skin :negative for itching, rash, hives  Neuro: negative for dizziness, headache, confusion, weakness  Psych: negative for anxiety, depression, change in mood  Heme/lymph: negative for bleeding, bruising, pallor    Physical Exam    BP (!) 153/83   Wt 104.4 kg (230 lb 3.2 oz)   BMI 36.05 kg/m²   Constitutional  Appearance: well-nourished, well developed, alert, in no acute distress    HENT  Head and Face: appears normal    Neck  Inspection/Palpation: normal appearance, no masses or tenderness  Lymph Nodes: no lymphadenopathy present  Thyroid: gland size normal, nontender, no nodules or masses present on palpation    Chest  Respiratory Effort: breathing labored  Auscultation: normal breath sounds    Cardiovascular  Heart:  Auscultation: regular rate and rhythm without murmur    Breasts  Inspection of Breasts: breasts symmetrical, no skin changes, no discharge present, nipple appearance normal, no skin retraction present  Palpation of Breasts and Axillae: no masses present on palpation, no breast tenderness  Axillary Lymph Nodes: no lymphadenopathy present    Gastrointestinal  Abdominal Examination: abdomen non-tender to palpation, normal bowel sounds, no masses present  Liver and spleen: no hepatomegaly present, spleen not palpable  Hernias: no hernias identified    Skin  General Inspection: no rash, no lesions identified    Neurologic/Psychiatric  Mental Status:  Orientation: grossly oriented to person, place and time  Mood and Affect: mood normal, affect

## 2024-02-22 ENCOUNTER — TELEPHONE (OUTPATIENT)
Age: 64
End: 2024-02-22

## 2024-02-22 RX ORDER — ESTRADIOL 1 MG/1
1 TABLET ORAL DAILY
Qty: 90 TABLET | Refills: 4 | Status: SHIPPED | OUTPATIENT
Start: 2024-02-22

## 2024-02-22 NOTE — TELEPHONE ENCOUNTER
Two patient identifiers used      64 year old patient last seen in the office on 2/16/2024 and is calling to say that her prescription for estrace  1mg was sent to her local pharmacy    Prescription resent as per MD verbal order to patient confirmed mail order pharmacy    Patient verbalized understanding.

## 2024-10-07 ENCOUNTER — HOSPITAL ENCOUNTER (OUTPATIENT)
Facility: HOSPITAL | Age: 64
Discharge: HOME | End: 2024-10-10
Attending: OBSTETRICS & GYNECOLOGY
Payer: MEDICARE

## 2024-10-07 VITALS — HEIGHT: 67 IN | BODY MASS INDEX: 36.1 KG/M2 | WEIGHT: 230 LBS

## 2024-10-07 DIAGNOSIS — Z12.31 VISIT FOR SCREENING MAMMOGRAM: ICD-10-CM

## 2024-10-07 PROCEDURE — 77063 BREAST TOMOSYNTHESIS BI: CPT

## 2025-03-31 RX ORDER — ESTRADIOL 1 MG/1
1 TABLET ORAL DAILY
Qty: 325 TABLET | OUTPATIENT
Start: 2025-03-31